# Patient Record
Sex: FEMALE | Race: WHITE | NOT HISPANIC OR LATINO | Employment: OTHER | URBAN - METROPOLITAN AREA
[De-identification: names, ages, dates, MRNs, and addresses within clinical notes are randomized per-mention and may not be internally consistent; named-entity substitution may affect disease eponyms.]

---

## 2017-04-06 ENCOUNTER — NEW REFERRAL (OUTPATIENT)
Dept: URBAN - METROPOLITAN AREA CLINIC 26 | Facility: CLINIC | Age: 77
End: 2017-04-06

## 2017-04-06 VITALS — BODY MASS INDEX: 21.73 KG/M2 | HEIGHT: 65.5 IN | WEIGHT: 132 LBS

## 2017-04-06 DIAGNOSIS — H04.123: ICD-10-CM

## 2017-04-06 DIAGNOSIS — H02.836: ICD-10-CM

## 2017-04-06 DIAGNOSIS — H40.023: ICD-10-CM

## 2017-04-06 DIAGNOSIS — H02.833: ICD-10-CM

## 2017-04-06 DIAGNOSIS — H35.373: ICD-10-CM

## 2017-04-06 DIAGNOSIS — H25.13: ICD-10-CM

## 2017-04-06 DIAGNOSIS — D31.32: ICD-10-CM

## 2017-04-06 PROCEDURE — G8420 CALC BMI NORM PARAMETERS: HCPCS

## 2017-04-06 PROCEDURE — 76512 OPH US DX B-SCAN: CPT

## 2017-04-06 PROCEDURE — 1036F TOBACCO NON-USER: CPT

## 2017-04-06 PROCEDURE — 92225 OPHTHALMOSCOPY (INITIAL): CPT

## 2017-04-06 PROCEDURE — 92004 COMPRE OPH EXAM NEW PT 1/>: CPT

## 2017-04-06 PROCEDURE — 2027F OPTIC NERVE HEAD EVAL DONE: CPT

## 2017-04-06 PROCEDURE — 92250 FUNDUS PHOTOGRAPHY W/I&R: CPT

## 2017-04-06 ASSESSMENT — VISUAL ACUITY
OD_SC: 20/70+1
OS_SC: 20/40-2
OD_PH: 20/40-2

## 2017-04-06 ASSESSMENT — TONOMETRY
OD_IOP_MMHG: 12
OS_IOP_MMHG: 12

## 2017-10-04 ENCOUNTER — LABORATORY RESULT (OUTPATIENT)
Age: 77
End: 2017-10-04

## 2017-10-04 ENCOUNTER — APPOINTMENT (OUTPATIENT)
Dept: INTERNAL MEDICINE | Facility: CLINIC | Age: 77
End: 2017-10-04
Payer: MEDICARE

## 2017-10-04 VITALS
WEIGHT: 132 LBS | SYSTOLIC BLOOD PRESSURE: 126 MMHG | OXYGEN SATURATION: 95 % | HEIGHT: 65 IN | BODY MASS INDEX: 21.99 KG/M2 | DIASTOLIC BLOOD PRESSURE: 82 MMHG

## 2017-10-04 DIAGNOSIS — R06.02 SHORTNESS OF BREATH: ICD-10-CM

## 2017-10-04 DIAGNOSIS — E78.5 HYPERLIPIDEMIA, UNSPECIFIED: ICD-10-CM

## 2017-10-04 DIAGNOSIS — Z00.00 ENCOUNTER FOR GENERAL ADULT MEDICAL EXAMINATION W/OUT ABNORMAL FINDINGS: ICD-10-CM

## 2017-10-04 DIAGNOSIS — I10 ESSENTIAL (PRIMARY) HYPERTENSION: ICD-10-CM

## 2017-10-04 DIAGNOSIS — Z87.891 PERSONAL HISTORY OF NICOTINE DEPENDENCE: ICD-10-CM

## 2017-10-04 DIAGNOSIS — M81.0 AGE-RELATED OSTEOPOROSIS W/OUT CURRENT PATHOLOGICAL FRACTURE: ICD-10-CM

## 2017-10-04 DIAGNOSIS — R91.8 OTHER NONSPECIFIC ABNORMAL FINDING OF LUNG FIELD: ICD-10-CM

## 2017-10-04 PROCEDURE — 99213 OFFICE O/P EST LOW 20 MIN: CPT

## 2017-10-04 RX ORDER — LOSARTAN POTASSIUM 50 MG/1
50 TABLET, FILM COATED ORAL
Refills: 0 | Status: ACTIVE | COMMUNITY

## 2017-10-04 RX ORDER — ATORVASTATIN CALCIUM 10 MG/1
10 TABLET, FILM COATED ORAL
Refills: 0 | Status: ACTIVE | COMMUNITY

## 2017-10-04 RX ORDER — HYDROCHLOROTHIAZIDE 12.5 MG/1
12.5 CAPSULE ORAL
Refills: 0 | Status: ACTIVE | COMMUNITY

## 2017-10-04 RX ORDER — MELATONIN 3 MG
TABLET ORAL
Refills: 0 | Status: ACTIVE | COMMUNITY

## 2017-10-04 RX ORDER — ASPIRIN 81 MG
81 TABLET, DELAYED RELEASE (ENTERIC COATED) ORAL
Refills: 0 | Status: ACTIVE | COMMUNITY

## 2017-10-23 ENCOUNTER — FORM ENCOUNTER (OUTPATIENT)
Age: 77
End: 2017-10-23

## 2017-10-23 LAB
ALBUMIN SERPL ELPH-MCNC: 4.3 G/DL
ALP BLD-CCNC: 70 U/L
ALT SERPL-CCNC: 10 U/L
ANION GAP SERPL CALC-SCNC: 16 MMOL/L
APPEARANCE: CLEAR
AST SERPL-CCNC: 17 U/L
B BURGDOR IGG+IGM SER QL IB: NORMAL
BACTERIA: NEGATIVE
BASOPHILS # BLD AUTO: 0.04 K/UL
BASOPHILS NFR BLD AUTO: 0.4 %
BILIRUB SERPL-MCNC: 0.7 MG/DL
BILIRUBIN URINE: NEGATIVE
BLOOD URINE: NEGATIVE
BUN SERPL-MCNC: 18 MG/DL
CALCIUM SERPL-MCNC: 9.9 MG/DL
CHLORIDE SERPL-SCNC: 100 MMOL/L
CHOLEST SERPL-MCNC: 199 MG/DL
CHOLEST/HDLC SERPL: 2 RATIO
CO2 SERPL-SCNC: 24 MMOL/L
COLOR: YELLOW
CREAT SERPL-MCNC: 0.86 MG/DL
EOSINOPHIL # BLD AUTO: 0.56 K/UL
EOSINOPHIL NFR BLD AUTO: 5.5 %
GLUCOSE QUALITATIVE U: NEGATIVE MG/DL
GLUCOSE SERPL-MCNC: 86 MG/DL
HBA1C MFR BLD HPLC: 5.5 %
HCT VFR BLD CALC: 43.8 %
HDLC SERPL-MCNC: 98 MG/DL
HGB BLD-MCNC: 14.4 G/DL
HYALINE CASTS: 0 /LPF
IMM GRANULOCYTES NFR BLD AUTO: 0.3 %
KETONES URINE: NEGATIVE
LDLC SERPL CALC-MCNC: 86 MG/DL
LEUKOCYTE ESTERASE URINE: NEGATIVE
LYMPHOCYTES # BLD AUTO: 2.35 K/UL
LYMPHOCYTES NFR BLD AUTO: 23.1 %
MAN DIFF?: NORMAL
MCHC RBC-ENTMCNC: 32.1 PG
MCHC RBC-ENTMCNC: 32.9 GM/DL
MCV RBC AUTO: 97.6 FL
MICROSCOPIC-UA: NORMAL
MONOCYTES # BLD AUTO: 0.84 K/UL
MONOCYTES NFR BLD AUTO: 8.3 %
NEUTROPHILS # BLD AUTO: 6.34 K/UL
NEUTROPHILS NFR BLD AUTO: 62.4 %
NITRITE URINE: NEGATIVE
PH URINE: 5
PLATELET # BLD AUTO: 252 K/UL
POTASSIUM SERPL-SCNC: 4 MMOL/L
PROT SERPL-MCNC: 7.1 G/DL
PROTEIN URINE: NEGATIVE MG/DL
RBC # BLD: 4.49 M/UL
RBC # FLD: 13.2 %
RED BLOOD CELLS URINE: 1 /HPF
SODIUM SERPL-SCNC: 140 MMOL/L
SPECIFIC GRAVITY URINE: 1.01
SQUAMOUS EPITHELIAL CELLS: 0 /HPF
T4 FREE SERPL-MCNC: 1.3 NG/DL
TRIGL SERPL-MCNC: 76 MG/DL
TSH SERPL-ACNC: 0.58 UIU/ML
UROBILINOGEN URINE: NEGATIVE MG/DL
WBC # FLD AUTO: 10.16 K/UL
WHITE BLOOD CELLS URINE: 1 /HPF

## 2017-10-24 ENCOUNTER — OUTPATIENT (OUTPATIENT)
Dept: OUTPATIENT SERVICES | Facility: HOSPITAL | Age: 77
LOS: 1 days | End: 2017-10-24
Payer: MEDICARE

## 2017-10-24 PROCEDURE — 71045 X-RAY EXAM CHEST 1 VIEW: CPT

## 2017-10-24 PROCEDURE — 71010: CPT | Mod: 26

## 2020-10-01 ENCOUNTER — OFFICE VISIT (OUTPATIENT)
Age: 80
End: 2020-10-01

## 2021-02-01 ENCOUNTER — OFFICE VISIT (OUTPATIENT)
Age: 81
End: 2021-02-01

## 2021-03-01 ENCOUNTER — OFFICE VISIT (OUTPATIENT)
Age: 81
End: 2021-03-01

## 2021-09-01 ENCOUNTER — OFFICE VISIT (OUTPATIENT)
Age: 81
End: 2021-09-01

## 2021-10-06 PROBLEM — I10 ESSENTIAL HYPERTENSION: Status: ACTIVE | Noted: 2017-10-04

## 2021-10-19 ENCOUNTER — OFFICE VISIT (OUTPATIENT)
Dept: URBAN - METROPOLITAN AREA CLINIC 9 | Facility: CLINIC | Age: 81
End: 2021-10-19

## 2021-10-26 ENCOUNTER — OFFICE VISIT (OUTPATIENT)
Dept: URBAN - METROPOLITAN AREA SURGERY CENTER 9 | Facility: SURGERY CENTER | Age: 81
End: 2021-10-26

## 2021-11-16 ENCOUNTER — OFFICE VISIT (OUTPATIENT)
Dept: URBAN - METROPOLITAN AREA CLINIC 9 | Facility: CLINIC | Age: 81
End: 2021-11-16

## 2022-07-30 ENCOUNTER — TELEPHONE ENCOUNTER (OUTPATIENT)
Age: 82
End: 2022-07-30

## 2022-07-31 ENCOUNTER — TELEPHONE ENCOUNTER (OUTPATIENT)
Age: 82
End: 2022-07-31

## 2022-07-31 RX ORDER — FAMOTIDINE 20 MG/1
1 TABLET ORAL
Qty: 0 | Refills: 16 | Status: ACTIVE | COMMUNITY
Start: 2021-11-16

## 2023-02-07 ENCOUNTER — DASHBOARD ENCOUNTERS (OUTPATIENT)
Age: 83
End: 2023-02-07

## 2023-02-07 ENCOUNTER — OFFICE VISIT (OUTPATIENT)
Dept: URBAN - METROPOLITAN AREA CLINIC 9 | Facility: CLINIC | Age: 83
End: 2023-02-07
Payer: MEDICARE

## 2023-02-07 ENCOUNTER — WEB ENCOUNTER (OUTPATIENT)
Dept: URBAN - METROPOLITAN AREA CLINIC 9 | Facility: CLINIC | Age: 83
End: 2023-02-07

## 2023-02-07 VITALS
SYSTOLIC BLOOD PRESSURE: 118 MMHG | DIASTOLIC BLOOD PRESSURE: 82 MMHG | BODY MASS INDEX: 22.82 KG/M2 | WEIGHT: 137 LBS | HEIGHT: 65 IN

## 2023-02-07 DIAGNOSIS — K21.9 GASTROESOPHAGEAL REFLUX DISEASE WITHOUT ESOPHAGITIS: ICD-10-CM

## 2023-02-07 DIAGNOSIS — Z87.11 HISTORY OF STOMACH ULCERS: ICD-10-CM

## 2023-02-07 PROCEDURE — 99213 OFFICE O/P EST LOW 20 MIN: CPT | Performed by: INTERNAL MEDICINE

## 2023-02-07 RX ORDER — FAMOTIDINE 20 MG/1
1 TABLET ORAL
Qty: 30 | Refills: 4 | OUTPATIENT
Start: 2021-11-16

## 2023-02-07 RX ORDER — ATORVASTATIN CALCIUM 10 MG/1
TABLET ORAL
Qty: 45 TABLET | Status: ACTIVE | COMMUNITY

## 2023-02-07 RX ORDER — AMOXICILLIN 500 MG/1
TAKE 4 CAPSULES BY MOUTH 1 HOUR PRIOR TO APPOINTMENT CAPSULE ORAL
Qty: 12 EACH | Refills: 0 | Status: ACTIVE | COMMUNITY

## 2023-02-07 RX ORDER — FAMOTIDINE 20 MG/1
1 TABLET ORAL
Qty: 0 | Refills: 16 | Status: ACTIVE | COMMUNITY
Start: 2021-11-16

## 2023-02-07 RX ORDER — IRBESARTAN 150 MG/1
TABLET ORAL
Qty: 30 TABLET | Status: ACTIVE | COMMUNITY

## 2023-02-07 RX ORDER — DORZOLAMIDE 20 MG/ML
INSTILL 1 DROP INTO BOTH EYES TWICE A DAY SOLUTION/ DROPS OPHTHALMIC
Qty: 30 MILLILITER | Refills: 1 | Status: ACTIVE | COMMUNITY

## 2023-02-07 NOTE — HPI-TODAY'S VISIT:
Pt here for discussion of a colonoscopy and also for f/u of GERD. . pt has a remote hx/o stomach ulcers . pt last colon in 2019 with 3 polyps removed, repeat due in 3 years Repeat not advised by prior GI . EGD in 10/2021 with Hp neg gastritis, no sweet's . We discussed her colon polyps and she is not interested in a colon. She will defer a colon unless she has issues. She is on pepcid and using occasionalliy and will better take famotidine given her mild esophagitis in the past. RTC 1 year.  .
No

## 2023-02-07 NOTE — PHYSICAL EXAM NEUROLOGIC:
cooperative with exam, normal mood with an appropriate affect , cooperative with exam, normal mood with an appropriate affect